# Patient Record
Sex: MALE | Employment: OTHER | URBAN - METROPOLITAN AREA
[De-identification: names, ages, dates, MRNs, and addresses within clinical notes are randomized per-mention and may not be internally consistent; named-entity substitution may affect disease eponyms.]

---

## 2017-09-01 ENCOUNTER — TELEPHONE (OUTPATIENT)
Dept: NEUROLOGY | Facility: CLINIC | Age: 67
End: 2017-09-01

## 2017-09-06 ENCOUNTER — TELEPHONE (OUTPATIENT)
Dept: NEUROLOGY | Facility: CLINIC | Age: 67
End: 2017-09-06

## 2017-09-27 ENCOUNTER — APPOINTMENT (OUTPATIENT)
Dept: GENERAL RADIOLOGY | Facility: HOSPITAL | Age: 67
DRG: 189 | End: 2017-09-27
Attending: EMERGENCY MEDICINE
Payer: MEDICARE

## 2017-09-27 ENCOUNTER — APPOINTMENT (OUTPATIENT)
Dept: CT IMAGING | Facility: HOSPITAL | Age: 67
DRG: 189 | End: 2017-09-27
Attending: EMERGENCY MEDICINE
Payer: MEDICARE

## 2017-09-27 ENCOUNTER — HOSPITAL ENCOUNTER (INPATIENT)
Facility: HOSPITAL | Age: 67
LOS: 4 days | Discharge: HOME HEALTH | DRG: 189 | End: 2017-10-01
Attending: EMERGENCY MEDICINE | Admitting: INTERNAL MEDICINE
Payer: MEDICARE

## 2017-09-27 DIAGNOSIS — G89.29 OTHER CHRONIC PAIN: ICD-10-CM

## 2017-09-27 DIAGNOSIS — R40.0 SOMNOLENCE: Primary | ICD-10-CM

## 2017-09-27 DIAGNOSIS — J96.02 ACUTE RESPIRATORY FAILURE WITH HYPERCAPNIA (HCC): ICD-10-CM

## 2017-09-27 PROBLEM — E87.5 HYPERKALEMIA: Status: ACTIVE | Noted: 2017-09-27

## 2017-09-27 PROBLEM — E87.29 RESPIRATORY ACIDOSIS: Status: ACTIVE | Noted: 2017-09-27

## 2017-09-27 PROBLEM — E87.2 RESPIRATORY ACIDOSIS: Status: ACTIVE | Noted: 2017-09-27

## 2017-09-27 PROCEDURE — 70450 CT HEAD/BRAIN W/O DYE: CPT | Performed by: EMERGENCY MEDICINE

## 2017-09-27 PROCEDURE — 71010 XR CHEST AP PORTABLE  (CPT=71010): CPT | Performed by: EMERGENCY MEDICINE

## 2017-09-27 RX ORDER — METOCLOPRAMIDE HYDROCHLORIDE 5 MG/ML
10 INJECTION INTRAMUSCULAR; INTRAVENOUS EVERY 8 HOURS PRN
Status: DISCONTINUED | OUTPATIENT
Start: 2017-09-27 | End: 2017-10-01

## 2017-09-27 RX ORDER — CLONIDINE HYDROCHLORIDE 0.3 MG/1
0.3 TABLET ORAL DAILY
Status: DISCONTINUED | OUTPATIENT
Start: 2017-09-28 | End: 2017-10-01

## 2017-09-27 RX ORDER — CLONIDINE HYDROCHLORIDE 0.3 MG/1
0.3 TABLET ORAL DAILY
COMMUNITY

## 2017-09-27 RX ORDER — FUROSEMIDE 20 MG/1
20 TABLET ORAL DAILY
Status: ON HOLD | COMMUNITY
End: 2017-10-01

## 2017-09-27 RX ORDER — ONDANSETRON 2 MG/ML
4 INJECTION INTRAMUSCULAR; INTRAVENOUS EVERY 6 HOURS PRN
Status: DISCONTINUED | OUTPATIENT
Start: 2017-09-27 | End: 2017-10-01

## 2017-09-27 RX ORDER — HEPARIN SODIUM 5000 [USP'U]/ML
5000 INJECTION, SOLUTION INTRAVENOUS; SUBCUTANEOUS EVERY 8 HOURS SCHEDULED
Status: DISCONTINUED | OUTPATIENT
Start: 2017-09-27 | End: 2017-10-01

## 2017-09-27 RX ORDER — ACETAMINOPHEN 325 MG/1
650 TABLET ORAL EVERY 6 HOURS PRN
Status: DISCONTINUED | OUTPATIENT
Start: 2017-09-27 | End: 2017-10-01

## 2017-09-27 RX ORDER — DIPHENHYDRAMINE HCL 25 MG
25 CAPSULE ORAL DAILY
COMMUNITY
End: 2017-10-01

## 2017-09-27 RX ORDER — ATORVASTATIN CALCIUM 40 MG/1
40 TABLET, FILM COATED ORAL NIGHTLY
Status: DISCONTINUED | OUTPATIENT
Start: 2017-09-27 | End: 2017-10-01

## 2017-09-27 RX ORDER — DEXTROSE MONOHYDRATE 25 G/50ML
50 INJECTION, SOLUTION INTRAVENOUS AS NEEDED
Status: DISCONTINUED | OUTPATIENT
Start: 2017-09-27 | End: 2017-10-01

## 2017-09-27 RX ORDER — CLOPIDOGREL BISULFATE 75 MG/1
75 TABLET ORAL DAILY
Status: DISCONTINUED | OUTPATIENT
Start: 2017-09-28 | End: 2017-10-01

## 2017-09-27 RX ORDER — METOPROLOL TARTRATE 50 MG/1
50 TABLET, FILM COATED ORAL 2 TIMES DAILY
Status: DISCONTINUED | OUTPATIENT
Start: 2017-09-28 | End: 2017-09-28

## 2017-09-27 RX ORDER — FUROSEMIDE 20 MG/1
20 TABLET ORAL
Status: DISCONTINUED | OUTPATIENT
Start: 2017-09-27 | End: 2017-10-01

## 2017-09-27 RX ORDER — CITALOPRAM 20 MG/1
40 TABLET ORAL DAILY
Status: DISCONTINUED | OUTPATIENT
Start: 2017-09-28 | End: 2017-10-01

## 2017-09-27 RX ORDER — DOCUSATE SODIUM 100 MG/1
100 CAPSULE, LIQUID FILLED ORAL 2 TIMES DAILY
Status: DISCONTINUED | OUTPATIENT
Start: 2017-09-27 | End: 2017-10-01

## 2017-09-27 RX ORDER — AMLODIPINE BESYLATE 5 MG/1
5 TABLET ORAL DAILY
Status: ON HOLD | COMMUNITY
End: 2017-10-01

## 2017-09-27 RX ORDER — IPRATROPIUM BROMIDE AND ALBUTEROL SULFATE 2.5; .5 MG/3ML; MG/3ML
3 SOLUTION RESPIRATORY (INHALATION) ONCE
Status: COMPLETED | OUTPATIENT
Start: 2017-09-27 | End: 2017-09-27

## 2017-09-27 RX ORDER — ATORVASTATIN CALCIUM 40 MG/1
40 TABLET, FILM COATED ORAL NIGHTLY
COMMUNITY

## 2017-09-27 RX ORDER — METOPROLOL TARTRATE 50 MG/1
50 TABLET, FILM COATED ORAL 2 TIMES DAILY
COMMUNITY
End: 2017-10-01

## 2017-09-27 RX ORDER — CITALOPRAM 40 MG/1
40 TABLET ORAL DAILY
COMMUNITY

## 2017-09-27 RX ORDER — DIPHENHYDRAMINE HCL 25 MG
25 CAPSULE ORAL EVERY 6 HOURS PRN
Status: DISCONTINUED | OUTPATIENT
Start: 2017-09-27 | End: 2017-10-01

## 2017-09-27 RX ORDER — AMLODIPINE BESYLATE 5 MG/1
5 TABLET ORAL DAILY
Status: DISCONTINUED | OUTPATIENT
Start: 2017-09-28 | End: 2017-09-28

## 2017-09-27 RX ORDER — TRAZODONE HYDROCHLORIDE 100 MG/1
100 TABLET ORAL NIGHTLY PRN
Status: DISCONTINUED | OUTPATIENT
Start: 2017-09-27 | End: 2017-10-01

## 2017-09-27 RX ORDER — INSULIN ASPART 100 [IU]/ML
25 INJECTION, SOLUTION INTRAVENOUS; SUBCUTANEOUS
COMMUNITY
End: 2017-10-01

## 2017-09-27 RX ORDER — SODIUM CHLORIDE 0.9 % (FLUSH) 0.9 %
3 SYRINGE (ML) INJECTION AS NEEDED
Status: DISCONTINUED | OUTPATIENT
Start: 2017-09-27 | End: 2017-10-01

## 2017-09-27 RX ORDER — HYDROCODONE BITARTRATE AND ACETAMINOPHEN 10; 325 MG/1; MG/1
1 TABLET ORAL 3 TIMES DAILY PRN
Status: ON HOLD | COMMUNITY
End: 2017-10-01

## 2017-09-27 RX ORDER — TRAZODONE HYDROCHLORIDE 100 MG/1
100 TABLET ORAL NIGHTLY
COMMUNITY

## 2017-09-27 RX ORDER — PREGABALIN 150 MG/1
150 CAPSULE ORAL 3 TIMES DAILY
COMMUNITY

## 2017-09-27 RX ORDER — PANTOPRAZOLE SODIUM 40 MG/1
40 TABLET, DELAYED RELEASE ORAL
Status: DISCONTINUED | OUTPATIENT
Start: 2017-09-28 | End: 2017-10-01

## 2017-09-27 RX ORDER — BISACODYL 10 MG
10 SUPPOSITORY, RECTAL RECTAL
Status: DISCONTINUED | OUTPATIENT
Start: 2017-09-27 | End: 2017-10-01

## 2017-09-27 RX ORDER — CLOPIDOGREL BISULFATE 75 MG/1
75 TABLET ORAL DAILY
COMMUNITY

## 2017-09-27 RX ORDER — FUROSEMIDE 20 MG/1
20 TABLET ORAL DAILY
Status: DISCONTINUED | OUTPATIENT
Start: 2017-09-28 | End: 2017-09-27

## 2017-09-27 RX ORDER — NALOXONE HYDROCHLORIDE 1 MG/ML
2 INJECTION INTRAMUSCULAR; INTRAVENOUS; SUBCUTANEOUS ONCE
Status: COMPLETED | OUTPATIENT
Start: 2017-09-27 | End: 2017-09-27

## 2017-09-27 RX ORDER — METOPROLOL TARTRATE 50 MG/1
50 TABLET, FILM COATED ORAL 2 TIMES DAILY
Status: DISCONTINUED | OUTPATIENT
Start: 2017-09-27 | End: 2017-09-27

## 2017-09-27 RX ORDER — PREGABALIN 75 MG/1
150 CAPSULE ORAL 3 TIMES DAILY
Status: DISCONTINUED | OUTPATIENT
Start: 2017-09-27 | End: 2017-10-01

## 2017-09-27 RX ORDER — DEXTROSE AND SODIUM CHLORIDE 5; .45 G/100ML; G/100ML
INJECTION, SOLUTION INTRAVENOUS CONTINUOUS
Status: DISCONTINUED | OUTPATIENT
Start: 2017-09-27 | End: 2017-09-28

## 2017-09-27 RX ORDER — POLYETHYLENE GLYCOL 3350 17 G/17G
17 POWDER, FOR SOLUTION ORAL DAILY PRN
Status: DISCONTINUED | OUTPATIENT
Start: 2017-09-27 | End: 2017-10-01

## 2017-09-27 NOTE — ED INITIAL ASSESSMENT (HPI)
Pt to ED c/o possible stroke alert. Pt was trying to see a doctor in a doctor's office and did not bring his O2. Was found in [facility] unresponsive. Pale upon EMS arrival. Arrives to ED awake, answering questions, somewhat slurred speech.

## 2017-09-27 NOTE — PROGRESS NOTES
09/27/17 1250   Clinical Encounter Type   Visited With Patient   Continue Visiting Yes   Crisis Visit ED   Patient Spiritual Encounters   Spiritual Assessment Completed 2     RRT: Stroke response, patient falling asleep, somewhat out of it.  Attempted to

## 2017-09-27 NOTE — DISCHARGE PLANNING
Called patient's daughter Lazaro Downs and notified her that he is in the ER. She was in the [facility] [employer] lot. She is on her way to the ER. Pt. Daughter states, \"he pops pills he's a user he overdoes it all the time. \"

## 2017-09-27 NOTE — CONSULTS
Critical Care Consult     Assessment / Plan:  1. Acute on hypercapnic respiratory failure - due to opiate use  - bipap as needed  - wean FiO2 as able  2. Slurred speech - due to recent dental surgery and opiate use  - CT brain pending  3.  Norco OD - appare History Main Topics   Smoking status: Not on file    Smokeless tobacco: Not on file    Alcohol use Not on file    Drug use: Unknown     Other Topics Concern   None on file     Social History Narrative   None on file       No family history on file.       Ex

## 2017-09-27 NOTE — PLAN OF CARE
Pt is not aware of the medications he takes. He states his med list was taken home with his daughter.    Daughter notified and asked to bring in the list.

## 2017-09-27 NOTE — H&P
[facility] Hospitalist H&P     CC: Patient presents with:  Altered Mental Status (neurologic)       PCP: Cathy Trevino MD      Assessment and Plan     Petey Son is a 78year old male with PMH sig for type 2 DM on oral meds, COPD on 3 L chronically, patient while in house    Patient and/or patient's family given opportunity to ask questions and note understanding and agreeing with therapeutic plan as outlined    Thank You,  Davie Lyn MD  [facility] Hospitalist    Answering Service number: [phone] early heart disease        Objective     Temp: 98.7 °F (37.1 °C)  Pulse: 56  Resp: 17  BP: 162/82  FiO2 (%): 60 %    Exam  Gen: No acute distress, alert and oriented x3, on BIPAP  Neck Supple, no JVD  Pulm:  Course B/S. bilaterally  CV: Heart with regular atelectasis or scarring.

## 2017-09-27 NOTE — ED PROVIDER NOTES
Patient Seen in: [facility] AND [facility] Emergency Department    History   Patient presents with:  Altered Mental Status (neurologic)    Stated Complaint:     HPI    The patient is a 25-year-old male brought for altered mental status and slurred speech.   He wa kg   SpO2 96%   BMI 33.13 kg/m²         Physical Exam   Constitutional: He is oriented to person, place, and time. He appears well-developed and well-nourished. He appears lethargic. He is sleeping. He is easily aroused. Non-toxic appearance.  He does not (*)     RDW 19.5 (*)     All other components within normal limits   TROPONIN I, 0 HOUR - Normal   CBC WITH DIFFERENTIAL WITH PLATELET    Narrative: The following orders were created for panel order CBC WITH DIFFERENTIAL WITH PLATELET.   Procedure to PCU for management of hypercarbia     ED critical care      I spent a total of 30 minutes of critical care time in obtaining history, performing a physical exam, bedside monitoring of interventions, collecting and interpreting tests and discussion with

## 2017-09-28 RX ORDER — AMLODIPINE BESYLATE 5 MG/1
5 TABLET ORAL 2 TIMES DAILY
Status: DISCONTINUED | OUTPATIENT
Start: 2017-09-28 | End: 2017-10-01

## 2017-09-28 NOTE — PROGRESS NOTES
Pulmonary Progress Note      NAME: Chilo Quevedo - ROOM: -A - MRN: [id number] - Age: 78year old - : 1950    Assessment/Plan:  1. Acute on hypercapnic respiratory failure - due to opiate use  - monitor off bipap  - wean FiO2 as able  2. respiratory distress. HEENT: Normocephalic atraumatic. Lips, mucosa, and tongue normal.  No thrush noted. Lungs: Diminished at the bases   Chest wall: No tenderness or deformity. Heart: Regular rate and rhythm, normal S1S2, no murmur.    Abdomen: soft,

## 2017-09-28 NOTE — PROGRESS NOTES
[facility]    Progress Note    Petey Son Patient Status:  Inpatient    1950 MRN [id number]   Location [facility] 2W/SW Attending Hawk Van MD   Hosp Day # 0 PCP Cathy Trevino MD       SUBJECTIVE: was f chest    Meds:     Current Facility-Administered Medications:  Normal Saline Flush 0.9 % injection 3 mL 3 mL Intravenous PRN   acetaminophen (TYLENOL) tab 650 mg 650 mg Oral Q6H PRN   ondansetron HCl (ZOFRAN) injection 4 mg 4 mg Intravenous Q6H PRN   Metoc heparin  GI with 40 mgs p0o  Atrophy Prophylaxis     Dispo: home    Questions/concerns were discussed with patient and/or family by bedside.     Total Time spent with patient and coordinating care:  40 minutes      Brooklyn Mratinez  9/27/2017  8:32 PM

## 2017-09-28 NOTE — H&P
[facility]    PATIENT'S NAME: Alanaesther Rosa   ATTENDING PHYSICIAN: Lisa Esquivel.  Constance Villatoro MD   PATIENT ACCOUNT#:   [id number]    LOCATION:  [address] [place] [address] RECORD #:   [id number]       YOB: 1950  ADMISSION DATE:       09/ 258.  NECK:  Supple. No thyromegaly, no JVD, no lymphadenopathy. LUNGS:  Good breath sounds. No wheezing or rhonchi. HEART:  S1, S2 normal.    ABDOMEN:  Soft, nontender. Good bowel sounds. No hepatosplenomegaly or masses palpated.     EXTREMITIES:  Fu

## 2017-09-29 RX ORDER — HYDRALAZINE HYDROCHLORIDE 50 MG/1
50 TABLET, FILM COATED ORAL 3 TIMES DAILY
Status: DISCONTINUED | OUTPATIENT
Start: 2017-09-29 | End: 2017-10-01

## 2017-09-29 RX ORDER — HYDROCODONE BITARTRATE AND ACETAMINOPHEN 5; 325 MG/1; MG/1
1 TABLET ORAL EVERY 12 HOURS PRN
Status: DISCONTINUED | OUTPATIENT
Start: 2017-09-29 | End: 2017-10-01

## 2017-09-29 RX ORDER — HYDRALAZINE HYDROCHLORIDE 50 MG/1
50 TABLET, FILM COATED ORAL 3 TIMES DAILY
Status: DISCONTINUED | OUTPATIENT
Start: 2017-09-29 | End: 2017-09-29

## 2017-09-29 NOTE — DIETARY NOTE
Diabetes Nutrition Knowledge Assessment    Pt reported visit to DM center at other facility 9 years ago. Diet history of 2-3 meals/day, limits pasta and other carbs in the diet. Has been increasing his intake of vegetables and fruits.  He reported receivi

## 2017-09-29 NOTE — PLAN OF CARE
Problem: Diabetes/Glucose Control  Goal: Glucose maintained within prescribed range  INTERVENTIONS:  - Monitor Blood Glucose as ordered  - Assess for signs and symptoms of hyperglycemia and hypoglycemia  - Administer ordered medications to maintain glucose coronary artery perfusion - ex.  Angina  - Evaluate fluid balance, assess for edema, trend weights   Outcome: Progressing      Problem: RESPIRATORY - ADULT  Goal: Achieves optimal ventilation and oxygenation  INTERVENTIONS:  - Assess for changes in respirat Progressing      Comments: VSS with no temperature. Has been up ambulating in room and sat at a chair for must for most of the day. Bed in locked and lowest position with belongings and call light in reach.  Patient is overflow will continue to monitor

## 2017-09-29 NOTE — PROGRESS NOTES
Pulmonary Progress Note      NAME: Otto White - ROOM: -A - MRN: [id number] - Age: 78year old - : 1950    Assessment/Plan:  1.  Acute on hypercapnic respiratory failure - due to opiate use  - monitor off bipap  - wean FiO2 as able  - °C) (Temporal)   Resp 17   Ht 188 cm (6' 2\")   Wt 259 lb 9.6 oz (117.8 kg)   SpO2 98%   BMI 33.33 kg/m²   Physical Exam:   General: alert, cooperative, no respiratory distress. AOx3   HEENT: Normocephalic atraumatic. Lips, mucosa, and tongue normal.  No th

## 2017-09-29 NOTE — DISCHARGE PLANNING
ERIN following up on d/c planning for the patient. ERIN met with him at bedside. He lives with his ex-wife, daughter and granddaughter at bedside. He has a cane and home oxygen from [place]. He has a h/o outpatient pt.   He is requesting smaller portable tanks

## 2017-09-29 NOTE — CHRONIC PAIN
[facility]  Report of Consultation    Maday Dinhkeyonna Patient Status:  Inpatient    1950 MRN [id number]   Location [facility] 2W/SW Attending Jasvir Wheeler MD   Hosp Day # 2 PCP Sofia Weiner MD     Date of Admiss Subcutaneous, TID CC  •  atorvastatin (LIPITOR) tab 40 mg, 40 mg, Oral, Nightly  •  Citalopram Hydrobromide (CeleXA) tab 40 mg, 40 mg, Oral, Daily  •  CloNIDine HCl (CATAPRES) tab 0.3 mg, 0.3 mg, Oral, Daily  •  Clopidogrel Bisulfate (PLAVIX) tab 75 mg, 75

## 2017-09-29 NOTE — PLAN OF CARE
Problem: Diabetes/Glucose Control  Goal: Glucose maintained within prescribed range  INTERVENTIONS:  - Monitor Blood Glucose as ordered  - Assess for signs and symptoms of hyperglycemia and hypoglycemia  - Administer ordered medications to maintain glucose supplementation based on oxygen saturation or ABGs  - Provide Smoking Cessation handout, if applicable  - Encourage broncho-pulmonary hygiene including cough, deep breathe, Incentive Spirometry  - Assess the need for suctioning and perform as needed  - Ass

## 2017-09-29 NOTE — PROGRESS NOTES
[facility]    Progress Note    Messi Krishnamurthy Patient Status:  Inpatient    1950 MRN [id number]   Location [facility] 2W/SW Attending Carlyn England MD   Hosp Day # 1 PCP Cecily Manuel MD       SUBJECTIVE:  No C Medications:  insulin detemir (LEVEMIR) 100 UNIT/ML flextouch 30 Units 30 Units Subcutaneous Nightly   [START ON 9/29/2017] metoprolol Tartrate (LOPRESSOR) tab 25 mg 25 mg Oral BID   AmLODIPine Besylate (NORVASC) tab 5 mg 5 mg Oral BID   Normal Saline Flus pain management consult    depression    Berta.  Neuropathy    obesity     Plan:   Labs and  X rays reviewed                     Prophylaxis:   DVT with heparin  GI with 40 mgs p0o  Atrophy Prophylaxis -activity          Questions/concerns were discussed wit

## 2017-09-30 PROCEDURE — 90792 PSYCH DIAG EVAL W/MED SRVCS: CPT | Performed by: OTHER

## 2017-09-30 NOTE — PLAN OF CARE
Problem: Patient/Family Goals  Goal: Patient/Family Long Term Goal  Patient's Long Term Goal: Control back pain.     Interventions:  - Norco PRN  - Monitor pain levels  - See additional Care Plan goals for specific interventions   Outcome: Progressing  Pt d

## 2017-09-30 NOTE — PROGRESS NOTES
[facility] HOSP - [facility]    Progress Note    Alfredito Justin Patient Status:  Inpatient    1950 MRN [id number]   Location [facility] 2W/SW Attending Elisabeth Cordero MD   Hosp Day # 2 PCP Mare Velez MD       SUBJECTIVE:  No C tab 25 mg 25 mg Oral BID   AmLODIPine Besylate (NORVASC) tab 5 mg 5 mg Oral BID   Normal Saline Flush 0.9 % injection 3 mL 3 mL Intravenous PRN   acetaminophen (TYLENOL) tab 650 mg 650 mg Oral Q6H PRN   ondansetron HCl (ZOFRAN) injection 4 mg 4 mg Intraven heparin  GI with 40 mgs p0o  Atrophy Prophylaxis -activity            Questions/concerns were discussed with patient and/or family by bedside.     Total Time spent with patient and coordinating care:  20minshaina Arzola  9/29/2017  9:37 PM

## 2017-09-30 NOTE — PROGRESS NOTES
[facility] Pulm/CC  -chart reviewed. Discharge pending. Not sent home yesterday b/c of home care issues. -COPD and home O2 requirements at baseline.  -no further pulm/CC issues. Will sign off. Please call with questions.     Maria Eugenia Berkowitz MD

## 2017-09-30 NOTE — CHRONIC PAIN
[facility]  Report of Consultation    Fabien Yin Patient Status:  Inpatient    1950 MRN [id number]   Location [facility] 2W/SW Attending García Quinonez MD   Hosp Day # 3 PCP Yvette Brown MD     Date of Admiss dextrose 50% injection 50 mL, 50 mL, Intravenous, PRN  •  Glucose-Vitamin C (DEX-4) 4-0.006 g chewable tab 4 tablet, 4 tablet, Oral, Q15 Min PRN  •  Insulin Aspart Pen (NOVOLOG) 100 UNIT/ML flexpen 1-5 Units, 1-5 Units, Subcutaneous, TID CC  •  atorvastati

## 2017-09-30 NOTE — PHYSICAL THERAPY NOTE
PHYSICAL THERAPY QUICK EVALUATION - INPATIENT    Room Number: 324/324-A  Evaluation Date: 9/30/2017          Problem List  Principal Problem:    Somnolence  Active Problems:    Hyperkalemia    Respiratory acidosis    Acute respiratory failure with hypercap [address]      FUNCTIONAL ABILITY STATUS  Gait Assessment  Gait Assistance: Modified independent  Distance (ft):  (200)  Assistive Device: Cane         pt on 3L of O2 SPO2 95%    Skilled Therapy Provided: activity pacing, bathroom home modification recommendation

## 2017-10-01 VITALS
RESPIRATION RATE: 18 BRPM | SYSTOLIC BLOOD PRESSURE: 122 MMHG | TEMPERATURE: 98 F | DIASTOLIC BLOOD PRESSURE: 63 MMHG | OXYGEN SATURATION: 92 % | WEIGHT: 255 LBS | HEART RATE: 61 BPM | BODY MASS INDEX: 32.73 KG/M2 | HEIGHT: 74 IN

## 2017-10-01 PROCEDURE — 99233 SBSQ HOSP IP/OBS HIGH 50: CPT | Performed by: OTHER

## 2017-10-01 RX ORDER — BUPROPION HYDROCHLORIDE 150 MG/1
150 TABLET ORAL DAILY
Qty: 30 TABLET | Refills: 0 | Status: SHIPPED | OUTPATIENT
Start: 2017-10-02

## 2017-10-01 RX ORDER — BUPROPION HYDROCHLORIDE 150 MG/1
150 TABLET ORAL DAILY
Status: DISCONTINUED | OUTPATIENT
Start: 2017-10-01 | End: 2017-10-01

## 2017-10-01 RX ORDER — HYDRALAZINE HYDROCHLORIDE 50 MG/1
50 TABLET, FILM COATED ORAL 3 TIMES DAILY
Qty: 90 TABLET | Refills: 0 | Status: SHIPPED | OUTPATIENT
Start: 2017-10-02

## 2017-10-01 RX ORDER — HYDROCODONE BITARTRATE AND ACETAMINOPHEN 10; 325 MG/1; MG/1
0.5 TABLET ORAL EVERY 12 HOURS PRN
Qty: 1 TABLET | Refills: 0 | Status: SHIPPED | OUTPATIENT
Start: 2017-10-01

## 2017-10-01 RX ORDER — ACETAMINOPHEN 325 MG/1
650 TABLET ORAL EVERY 6 HOURS PRN
Qty: 1 TABLET | Refills: 0 | Status: SHIPPED | OUTPATIENT
Start: 2017-10-01

## 2017-10-01 RX ORDER — AMLODIPINE BESYLATE 5 MG/1
5 TABLET ORAL 2 TIMES DAILY
Qty: 1 TABLET | Refills: 0 | Status: SHIPPED | OUTPATIENT
Start: 2017-10-01

## 2017-10-01 RX ORDER — FUROSEMIDE 20 MG/1
20 TABLET ORAL 2 TIMES DAILY
Qty: 1 TABLET | Refills: 0 | Status: SHIPPED | OUTPATIENT
Start: 2017-10-01

## 2017-10-01 NOTE — PROGRESS NOTES
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 Otherwise the patient admitted that he has been depressed and feeling his mood has been very low affecting his physical condition and physical desire.   Patient exhibited dysphoric affect congruent with the mood was some

## 2017-10-01 NOTE — PROGRESS NOTES
[facility]    Progress Note    Alicia Sánchez Patient Status:  Inpatient    1950 MRN [id number]   Location [facility] 3W/SW Attending Asim Galvaiz MD   Hosp Day # 3 PCP Esdras Escobedo MD       SUBJECTIVE:  No C metoprolol Tartrate (LOPRESSOR) tab 25 mg 25 mg Oral BID   AmLODIPine Besylate (NORVASC) tab 5 mg 5 mg Oral BID   Normal Saline Flush 0.9 % injection 3 mL 3 mL Intravenous PRN   acetaminophen (TYLENOL) tab 650 mg 650 mg Oral Q6H PRN   ondansetron HCl (ZO prescription study     Prophylaxis:   DVT with heparin  GI with 40 mgs p0o  Atrophy Prophylaxis -activity            Questions/concerns were discussed with patient and/or family by bedside.     Total Time spent with patient and coordinating care: Joe Judge

## 2017-10-01 NOTE — PLAN OF CARE
CARDIOVASCULAR - ADULT    • Maintains optimal cardiac output and hemodynamic stability Progressing        Diabetes/Glucose Control    • Glucose maintained within prescribed range Progressing        Impaired Activities of Daily Living    • Achieve highest/s

## 2017-10-01 NOTE — PLAN OF CARE
Problem: Patient/Family Goals  Goal: Patient/Family Long Term Goal  Patient's Long Term Goal: Control back pain.     Interventions:  - Norco PRN  - Monitor pain levels  - See additional Care Plan goals for specific interventions    Outcome: Benjamin Cai

## 2017-10-01 NOTE — CM/SW NOTE
SW informed that the patient will be d/c'd today. [facility] notified via VM. Re the conserving device, forms were not signed and patient will need to follow-up with his PCP.  Patient has home oxygen, however RN tested him and he did not qualify

## 2017-10-01 NOTE — CONSULTS
[facility]    Report of Consultation    Zoltan Angel Patient Status:  Inpatient    1950 MRN [id number]   Location [facility] 3W/SW Attending Darshana Lucero MD   Hosp Day # 4 PCP Johnathan Arzola MD     Date of Ad been exhibiting more depressive symptom with lack of interest and lack of desire with worsening in his energy and motivation.   Patient denied being hopeless or helpless and deny suicidal ideation but admitted that he has been exhibiting overwhelming feelin 4-0.006 g chewable tab 4 tablet 4 tablet Oral Q15 Min PRN   Insulin Aspart Pen (NOVOLOG) 100 UNIT/ML flexpen 1-5 Units 1-5 Units Subcutaneous TID CC   atorvastatin (LIPITOR) tab 40 mg 40 mg Oral Nightly   Citalopram Hydrobromide (CeleXA) tab 40 mg 40 mg Or Admitting/Attending    Results:     Laboratory Data:    Lab Results  Component Value Date   WBC 6.3 09/28/2017   HGB 11.9 (L) 09/28/2017   HCT 37.1 (L) 09/28/2017    (L) 09/28/2017   CREATSERUM 1.10 09/28/2017   BUN 14 09/28/2017    09/28/2017 Metabolic Panel (8)      Troponin I, 0 Hour      Arterial blood gas      Urinalysis with Culture Reflex Once      Basic Metabolic Panel (8)      Potassium      CBC With Differential With Platelet      Hemoglobin A1C      Drug Abuse Panel 10 screen Once

## 2017-10-02 ENCOUNTER — TELEPHONE (OUTPATIENT)
Dept: CARDIOLOGY UNIT | Facility: HOSPITAL | Age: 67
End: 2017-10-02

## 2017-10-04 ENCOUNTER — TELEPHONE (OUTPATIENT)
Dept: ORTHOPEDICS CLINIC | Facility: CLINIC | Age: 67
End: 2017-10-04

## 2017-10-04 NOTE — TELEPHONE ENCOUNTER
Call back from Er visit. a week ago. Was admitted and was in hospital for 4 days. Some of his medications have been changed. He feels stiff from being in the hospital bed for 4 days. On his home o2 and watching his blood sugars./ diabetes.    He appreciates

## 2018-04-02 NOTE — DISCHARGE SUMMARY
[facility]    PATIENT'S NAME: Guerline Rebolledoa   ATTENDING PHYSICIAN: Etienne Slade.  Sky Flood MD   PATIENT ACCOUNT#:   [id number]    LOCATION:  [address] [place] [address] RECORD #:   [id number]       YOB: 1950  ADMISSION DATE:       09/ obtained, recommendations followed. DISCHARGE INSTRUCTIONS:    1.   Diabetes management with insulin. 2.   Pain control with decreasing doses of Norco.  3.   Pain Management has seen him and their recommendations are followed.   Slow progressive impro